# Patient Record
Sex: MALE | URBAN - METROPOLITAN AREA
[De-identification: names, ages, dates, MRNs, and addresses within clinical notes are randomized per-mention and may not be internally consistent; named-entity substitution may affect disease eponyms.]

---

## 2021-11-16 ENCOUNTER — OFFICE VISIT (OUTPATIENT)
Dept: URBAN - METROPOLITAN AREA CLINIC 30 | Facility: CLINIC | Age: 56
End: 2021-11-16
Payer: MEDICARE

## 2021-11-16 DIAGNOSIS — E11.9 TYPE 2 DIABETES MELLITUS W/O COMPLICATION: Primary | ICD-10-CM

## 2021-11-16 DIAGNOSIS — Z79.84 LONG TERM USE OF ORAL ANTIDIABETIC DRUG: ICD-10-CM

## 2021-11-16 DIAGNOSIS — H40.023 OPEN ANGLE WITH BORDERLINE FINDINGS, HIGH RISK, BILATERAL: ICD-10-CM

## 2021-11-16 PROCEDURE — 92004 COMPRE OPH EXAM NEW PT 1/>: CPT | Performed by: OPTOMETRIST

## 2021-11-16 PROCEDURE — 92134 CPTRZ OPH DX IMG PST SGM RTA: CPT | Performed by: OPTOMETRIST

## 2021-11-16 PROCEDURE — 92133 CPTRZD OPH DX IMG PST SGM ON: CPT | Performed by: OPTOMETRIST

## 2021-11-16 ASSESSMENT — KERATOMETRY
OS: 43.02
OD: 43.10

## 2021-11-16 ASSESSMENT — VISUAL ACUITY
OS: 20/20
OD: 20/20

## 2021-11-16 ASSESSMENT — INTRAOCULAR PRESSURE
OD: 19
OS: 19

## 2021-11-16 NOTE — IMPRESSION/PLAN
Impression: Long term use of oral antidiabetic drug: Z79.84. Plan: see other diabetic note- off insulin ~ 7/2021, had taken for ~ 4 years.  Recent 25lb weight loss

## 2021-11-16 NOTE — IMPRESSION/PLAN
Impression: Open angle with borderline findings, high risk, bilateral: H40.023. +FHx- possibly MGM Plan: Due to cupping OS>OD. IDDM for years and recently off insulin. 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 Pt educ.

## 2021-11-16 NOTE — IMPRESSION/PLAN
Impression: Type 2 diabetes mellitus w/o complication: K86.6. Plan: No retinopathy on exam today. Patient educated on importance of BG control. Monitor annually.